# Patient Record
Sex: MALE | Employment: UNEMPLOYED | URBAN - METROPOLITAN AREA
[De-identification: names, ages, dates, MRNs, and addresses within clinical notes are randomized per-mention and may not be internally consistent; named-entity substitution may affect disease eponyms.]

---

## 2017-01-27 ENCOUNTER — TELEPHONE (OUTPATIENT)
Dept: MEDICAL GROUP | Age: 52
End: 2017-01-27

## 2017-01-27 DIAGNOSIS — H40.9 GLAUCOMA, UNSPECIFIED GLAUCOMA, UNSPECIFIED LATERALITY: ICD-10-CM

## 2017-01-27 NOTE — TELEPHONE ENCOUNTER
----- Message from Your Healthcare Team sent at 1/26/2017  9:30 PM PST -----  Regarding: Non-Urgent Medical Question  Contact: [phone]  Hi.  I need a referral for my eyes.  They seem to be getting worse.  Dr Alston.  Thank you.

## 2017-01-28 NOTE — TELEPHONE ENCOUNTER
1. Caller Name: Adrián Juárez                                         Call Back Number: [phone] (home)       Patient approves a detailed voicemail message: N\A    2. SPECIFIC Action To Be Taken: Referral pending, please sign.    3. Diagnosis/Clinical Reason for Request: [place]/Patient stated eyes are getting worse    4. Specialty & Provider Name/Lab/Imaging Location: ophthalmology Dr. Alston    5. Is appointment scheduled for requested order/referral: no    Patient informed they will receive a return phone call from the office ONLY if there are any questions before processing their request. Advised to call back if they haven't received a call from the referral department in 5 days.

## 2017-01-28 NOTE — TELEPHONE ENCOUNTER
----- Message from Your Healthcare Team sent at 1/26/2017  9:30 PM PST -----  Regarding: Non-Urgent Medical Question  Contact: [phone]  Hi.  I need a referral for my eyes.  They seem to be getting worse.  Dr Alston.  Thank you.

## 2017-02-02 NOTE — TELEPHONE ENCOUNTER
1. Caller Name: Adrián Juárez                                           Call Back Number: [phone] (home)        Patient approves a detailed voicemail message: N\A    Informed patient that referral has been placed.

## 2017-04-28 RX ORDER — SERTRALINE HYDROCHLORIDE 100 MG/1
TABLET, FILM COATED ORAL
Qty: 135 TAB | Refills: 0 | Status: SHIPPED | OUTPATIENT
Start: 2017-04-28 | End: 2017-06-29 | Stop reason: SDUPTHER

## 2017-06-29 ENCOUNTER — OFFICE VISIT (OUTPATIENT)
Dept: MEDICAL GROUP | Facility: LAB | Age: 52
End: 2017-06-29
Payer: COMMERCIAL

## 2017-06-29 VITALS
DIASTOLIC BLOOD PRESSURE: 68 MMHG | SYSTOLIC BLOOD PRESSURE: 112 MMHG | TEMPERATURE: 98.5 F | BODY MASS INDEX: 27.2 KG/M2 | RESPIRATION RATE: 12 BRPM | HEART RATE: 66 BPM | WEIGHT: 190 LBS | OXYGEN SATURATION: 96 % | HEIGHT: 70 IN

## 2017-06-29 DIAGNOSIS — F41.1 GAD (GENERALIZED ANXIETY DISORDER): ICD-10-CM

## 2017-06-29 DIAGNOSIS — H57.89 BURNING SENSATION IN EYE: ICD-10-CM

## 2017-06-29 DIAGNOSIS — M79.2 RADICULAR PAIN IN RIGHT ARM: ICD-10-CM

## 2017-06-29 DIAGNOSIS — R73.01 IMPAIRED FASTING GLUCOSE: ICD-10-CM

## 2017-06-29 DIAGNOSIS — M72.2 PLANTAR FASCIITIS, BILATERAL: ICD-10-CM

## 2017-06-29 DIAGNOSIS — Z00.00 HEALTH MAINTENANCE EXAMINATION: ICD-10-CM

## 2017-06-29 DIAGNOSIS — E55.9 VITAMIN D INSUFFICIENCY: ICD-10-CM

## 2017-06-29 DIAGNOSIS — E78.2 MIXED HYPERLIPIDEMIA: ICD-10-CM

## 2017-06-29 DIAGNOSIS — H40.1110 PRIMARY OPEN ANGLE GLAUCOMA OF RIGHT EYE, UNSPECIFIED GLAUCOMA STAGE: ICD-10-CM

## 2017-06-29 PROBLEM — H40.1190 PRIMARY OPEN ANGLE GLAUCOMA: Status: ACTIVE | Noted: 2017-06-29

## 2017-06-29 PROCEDURE — 99214 OFFICE O/P EST MOD 30 MIN: CPT | Performed by: FAMILY MEDICINE

## 2017-06-29 RX ORDER — SERTRALINE HYDROCHLORIDE 100 MG/1
200 TABLET, FILM COATED ORAL
Qty: 180 TAB | Refills: 3 | Status: SHIPPED | OUTPATIENT
Start: 2017-06-29 | End: 2018-07-12 | Stop reason: SDUPTHER

## 2017-06-29 ASSESSMENT — PATIENT HEALTH QUESTIONNAIRE - PHQ9: CLINICAL INTERPRETATION OF PHQ2 SCORE: 0

## 2017-06-29 NOTE — MR AVS SNAPSHOT
"        Adrián Juárez   2017 1:00 PM   Office Visit   MRN: [id number]    Department:  [facility]   Dept Phone:  [phone]    Description:  Male : 1965   Provider:  Mily Cha M.D.           Reason for Visit     Establish Care           Allergies as of 2017     Allergen Noted Reactions    Nkda [No Known Drug Allergy] 2010         You were diagnosed with     ALLIE (generalized anxiety disorder)   [974262]       Radicular pain in right arm   [292882]       Plantar fasciitis, bilateral   [135680]       Primary open angle glaucoma of right eye, unspecified glaucoma stage   [7728715]       Burning sensation in eye   [855726]       Impaired fasting glucose   [790.21.ICD-9-CM]       Mixed hyperlipidemia   [272.2.ICD-9-CM]       Vitamin D insufficiency   [671838]       Health maintenance examination   [692736]         Vital Signs     Blood Pressure Pulse Temperature Respirations Height Weight    112/68 mmHg 66 36.9 °C (98.5 °F) 12 1.778 m (5' 10\") 86.183 kg (190 lb)    Body Mass Index Oxygen Saturation Smoking Status             27.26 kg/m2 96% Never Smoker          Basic Information     Date Of Birth Sex Race Ethnicity Preferred Language    1965 Male Unknown Unknown English      Your appointments     Dec 15, 2017  8:40 AM   Established Patient with Mily Cha M.D.   [facility] (--)    [address] [address] [id number] [phone]           You will be receiving a confirmation call a few days before your appointment from our automated call confirmation system.              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   2011 - Present    Vitamin " D insufficiency E55.9   2/25/2012 - Present    Insomnia G47.00   4/30/2012 - Present    Impaired fasting glucose R73.01   4/15/2013 - Present    Coccyxdynia M53.3   12/3/2013 - Present    Elevated BP HVL2211   2/21/2014 - Present    Family history of colon cancer Z80.0   3/13/2015 - Present    Glaucoma H40.9   3/13/2015 - Present    Primary open angle glaucoma H40.1190   6/29/2017 - Present      Health Maintenance        Date Due Completion Dates    IMM DTaP/Tdap/Td Vaccine (1 - Tdap) 9/23/1986 ---    COLONOSCOPY 6/29/2020 6/29/2015            Current Immunizations     Influenza TIV (IM) 11/22/2016, 11/8/2014, 12/2/2013    Influenza Vaccine Quad Inj (Preserved) 2/19/2016 12:19 PM      Below and/or attached are the medications your provider expects you to take. Review all of your home medications and newly ordered medications with your provider and/or pharmacist. Follow medication instructions as directed by your provider and/or pharmacist. Please keep your medication list with you and share with your provider. Update the information when medications are discontinued, doses are changed, or new medications (including over-the-counter products) are added; and carry medication information at all times in the event of emergency situations     Allergies:  NKDA - (reactions not documented)               Medications  Valid as of: June 29, 2017 -  1:41 PM    Generic Name Brand Name Tablet Size Instructions for use    Atorvastatin Calcium (Tab) LIPITOR 20 MG TAKE 1 TABLET BY MOUTH AT BEDTIME        Bimatoprost (Solution) LUMIGAN 0.01 % Place 1 Drop in both eyes every bedtime.        Cholecalciferol (Tab) cholecalciferol 1000 UNIT Take 2,000 Units by mouth 2 Times a Day.        Omega-3 Fatty Acids (Cap) OMEGA 3 FA 1000 MG Take 1,000 mg by mouth 2 Times a Day.        Sertraline HCl (Tab) ZOLOFT 100 MG Take 2 Tabs by mouth every day.        .                 Medicines prescribed today were sent to:     [id number]  - [place], [place] - [address] AT [facility]    [address] [place] [address]    Phone: [phone] Fax: [phone]    Open 24 Hours?: No      Medication refill instructions:       If your prescription bottle indicates you have medication refills left, it is not necessary to call your provider’s office. Please contact your pharmacy and they will refill your medication.    If your prescription bottle indicates you do not have any refills left, you may request refills at any time through one of the following ways: The online [employer] system (except Urgent Care), by calling your provider’s office, or by asking your pharmacy to contact your provider’s office with a refill request. Medication refills are processed only during regular business hours and may not be available until the next business day. Your provider may request additional information or to have a follow-up visit with you prior to refilling your medication.   *Please Note: Medication refills are assigned a new Rx number when refilled electronically. Your pharmacy may indicate that no refills were authorized even though a new prescription for the same medication is available at the pharmacy. Please request the medicine by name with the pharmacy before contacting your provider for a refill.        Your To Do List     Future Labs/Procedures Complete By Expires    COMP METABOLIC PANEL  As directed 6/30/2018    HEMOGLOBIN A1C  As directed 6/30/2018    LIPID PROFILE  As directed 6/30/2018    VITAMIN D,25 HYDROXY  As directed 6/30/2018      Referral     A referral request has been sent to our patient care coordination department. Please allow 3-5 business days for us to process this request and contact you either by phone or mail. If you do not hear from us by the 5th business day, please call us at [phone].           [employer] Access Code: Activation code not generated  Current [employer] Status: Active

## 2017-06-30 NOTE — PROGRESS NOTES
Adrián Juárez is a 51 y.o. male here for   Chief Complaint   Patient presents with   • Establish Care       HPI:  Adrián is a very pleasant 51 y.o. male. He is here today to establish care. He owns a cleaning supply company. Previous PCP Dr. Boyd     1. ALLIE (generalized anxiety disorder)  This is chronic. Patient is currently prescribed Zoloft 50 mg daily. Patient has been increasing this every few days to 200 mg seems to control his outbursts and anxiety much better. He denies any depressive symptoms. He is eating and sleeping well. He denies any suicidal thoughts. He would like to increase his medication if possible    2. Radicular pain in right arm  This is a new problem to discuss. The last couple of years patient has developed right wrist and elbow pain. He notices this is much worse when using his mouse. He did upgrade to a more ergonomic mouse which did seem to help. He states that the pain is sometimes in his wrists but other times on the lateral side of his elbow. When it is in his elbow ache radiates up into his shoulder and down into his fingers. He does get a burning sensation at times. He does not have any numbness or weakness. He has not noticed any swelling. He has no discrete injury that he can think of. He has purchased a left-handed mouse to see if this will resolve his symptoms.    3. Plantar fasciitis, bilateral  This is a new problem to discuss. Patient reports bilateral plantar foot pain worst first thing in the morning. It is improved with stretching. He has not had any injury. He denies any bruising or swelling. He denies any numbness or tingling. It is an aching pain.    4. Primary open angle glaucoma of right eye, unspecified glaucoma stage  5. Burning sensation in eye  Glaucoma is chronic and he has been seeing an ophthalmologist. He was recently prescribed a new eyedrop to go on both eyes although the glaucoma is only on the right by his report. He has developed stinging and burning in the eyes  when he puts in drops. He states that the eyes become bright red. He denies any vision changes.    6. Impaired fasting glucose  This is chronic. Patient was on metformin many years ago but did lose weight and was taken off of this. He continues to have elevated fasting glucose. His hemoglobin A1c has been in the prediabetes range. His last labs were done in August 2016. He has dessert most nights and is not dieting although he did quit drinking alcohol 3 years ago    7. Mixed hyperlipidemia  This is chronic. Doing well.  Taking atorvastatin 20 mg daily as prescribed. No myalgias, GI upset, or other side effects from medication. Denies CP or stroke symptoms. Also taking a daily ASA. Last lipid panel August 2016 and was reviewed with the patient.     8. Vitamin D insufficiency  This is chronic. Currently taking 2000 units of vitamin D most days. Sometimes he forgets.    9. Health maintenance examination  Patient did have an early colonoscopy and this is scanned into the chart. He is unsure about his last tetanus booster but declines this today and asks if he can get this at his next visit      Current medicines (including changes today)  Current Outpatient Prescriptions   Medication Sig Dispense Refill   • sertraline (ZOLOFT) 100 MG Tab Take 2 Tabs by mouth every day. 180 Tab 3   • atorvastatin (LIPITOR) 20 MG Tab TAKE 1 TABLET BY MOUTH AT BEDTIME 90 Tab 2   • vitamin D (CHOLECALCIFEROL) 1000 UNIT TABS Take 2,000 Units by mouth 2 Times a Day.     • bimatoprost (LUMIGAN) 0.01 % SOLN Place 1 Drop in both eyes every bedtime.     • docosahexanoic acid (FISH OIL) 1000 MG CAPS Take 1,000 mg by mouth 2 Times a Day.       No current facility-administered medications for this visit.     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  He  has past surgical history that includes dental extraction(s).  Social History   Substance Use Topics   • Smoking status: Never Smoker    • Smokeless tobacco: Never Used   • Alcohol Use: No      Comment: None since July 28, 2013. C+A+G+E-. Oct 2013: resumed drinking \"a few times per week\"     Social History     Social History Narrative     Family History   Problem Relation Age of Onset   • Cancer Father 60     Colon   • Diabetes Father      Family Status   Relation Status Death Age   • Father Alive      dx colon cancer 60s         ROS  Positive for arm pain, foot pain, itchy eyes, red eyes, neck stiffness  All other systems reviewed and are negative     Objective:     Blood pressure 112/68, pulse 66, temperature 36.9 °C (98.5 °F), resp. rate 12, height 1.778 m (5' 10\"), weight 86.183 kg (190 lb), SpO2 96 %. Body mass index is 27.26 kg/(m^2).  Physical Exam:    Constitutional: Alert, no distress.  Skin: Warm, dry, good turgor, no rashes in visible areas.  Eye: Equal, round and reactive, conjunctiva clear, lids normal.  ENMT: Lips without lesions, good dentition, oropharynx clear. TM's pearly gray with normal light reflexes bilaterally  Neck: Trachea midline, no masses, no thyromegaly. No cervical or supraclavicular lymphadenopathy.  Respiratory: Unlabored respiratory effort, lungs clear to auscultation bilaterally, no wheezes, no ronchi.  Cardiovascular: Normal S1, S2, RRR, no murmur, no edema.  Abdomen: Soft, non-tender, no masses, no hepatosplenomegaly.  Psych: Alert and oriented x3, normal affect and mood.      Assessment and Plan:   The following treatment plan was discussed    1. ALLIE (generalized anxiety disorder)  Chronic, not controlled  Increase Zoloft to 200 mg daily  Return in 6 months, sooner if no improvement of his symptoms  - sertraline (ZOLOFT) 100 MG " Tab; Take 2 Tabs by mouth every day.  Dispense: 180 Tab; Refill: 3    2. Radicular pain in right arm  New, likely secondary to his work using his mouse often throughout the day  Stretching exercises and physical therapy ordered  - REFERRAL TO PHYSICAL THERAPY Reason for Therapy: Eval/Treat/Report    3. Plantar fasciitis, bilateral  New, home exercises including stretching, ice, massage prescribed. Return if no improvement    4. Primary open angle glaucoma of right eye, unspecified glaucoma stage  Chronic, stable, continue follow-up with ophthalmology  - REFERRAL TO OPHTHALMOLOGY    5. Burning sensation in eye  New, likely secondary to eyedrops.   I have asked him to discuss this with his ophthalmologist  - REFERRAL TO OPHTHALMOLOGY    6. Impaired fasting glucose  Chronic, stable, recheck labs  - COMP METABOLIC PANEL; Future  - HEMOGLOBIN A1C; Future    7. Mixed hyperlipidemia  Chronic, stable, recheck labs  - LIPID PROFILE; Future    8. Vitamin D insufficiency  Chronic, stable, recheck labs  - VITAMIN D,25 HYDROXY; Future    9. Health maintenance examination  Labs ordered, patient will get his tetanus booster at the next visit as he does not want to get this done today.  - COMP METABOLIC PANEL; Future  - HEMOGLOBIN A1C; Future  - LIPID PROFILE; Future  - VITAMIN D,25 HYDROXY; Future      Records requested.  Followup: Return in about 6 months (around 12/29/2017) for prediabetes.         This note was created using voice recognition software. I have made every reasonable attempt to correct errors, however, I do anticipate some grammatical errors.

## 2017-08-29 DIAGNOSIS — E78.2 MIXED HYPERLIPIDEMIA: ICD-10-CM

## 2017-08-30 DIAGNOSIS — E78.2 MIXED HYPERLIPIDEMIA: ICD-10-CM

## 2017-08-30 RX ORDER — ATORVASTATIN CALCIUM 20 MG/1
TABLET, FILM COATED ORAL
Refills: 0 | OUTPATIENT
Start: 2017-08-30

## 2017-08-30 RX ORDER — ATORVASTATIN CALCIUM 20 MG/1
TABLET, FILM COATED ORAL
Qty: 90 TAB | Refills: 0 | Status: SHIPPED | OUTPATIENT
Start: 2017-08-30 | End: 2017-12-07 | Stop reason: SDUPTHER

## 2017-10-18 ENCOUNTER — OFFICE VISIT (OUTPATIENT)
Dept: MEDICAL GROUP | Facility: LAB | Age: 52
End: 2017-10-18
Payer: COMMERCIAL

## 2017-10-18 VITALS
RESPIRATION RATE: 12 BRPM | TEMPERATURE: 97.8 F | SYSTOLIC BLOOD PRESSURE: 108 MMHG | DIASTOLIC BLOOD PRESSURE: 66 MMHG | HEIGHT: 70 IN | WEIGHT: 194 LBS | OXYGEN SATURATION: 97 % | HEART RATE: 56 BPM | BODY MASS INDEX: 27.77 KG/M2

## 2017-10-18 DIAGNOSIS — Z23 NEED FOR VACCINATION: ICD-10-CM

## 2017-10-18 DIAGNOSIS — K21.9 GASTROESOPHAGEAL REFLUX DISEASE, ESOPHAGITIS PRESENCE NOT SPECIFIED: ICD-10-CM

## 2017-10-18 DIAGNOSIS — K59.01 SLOW TRANSIT CONSTIPATION: ICD-10-CM

## 2017-10-18 DIAGNOSIS — J39.2 THROAT IRRITATION: ICD-10-CM

## 2017-10-18 PROBLEM — G89.29 CHRONIC RIGHT SHOULDER PAIN: Status: ACTIVE | Noted: 2017-10-18

## 2017-10-18 PROBLEM — M25.511 CHRONIC RIGHT SHOULDER PAIN: Status: ACTIVE | Noted: 2017-10-18

## 2017-10-18 PROCEDURE — 90471 IMMUNIZATION ADMIN: CPT | Performed by: FAMILY MEDICINE

## 2017-10-18 PROCEDURE — 90686 IIV4 VACC NO PRSV 0.5 ML IM: CPT | Performed by: FAMILY MEDICINE

## 2017-10-18 PROCEDURE — 99214 OFFICE O/P EST MOD 30 MIN: CPT | Mod: 25 | Performed by: FAMILY MEDICINE

## 2017-10-18 NOTE — PROGRESS NOTES
Subjective:   Adrián Juárez is a 52 y.o. male here today for   Chief Complaint   Patient presents with   • Other     scratch in throat x 1wk     1. Throat irritation  5 days of feeling as though there were two scratches or abrasions on the top of the soft palate  It worsened to irritation along the full aspect of the soft palate  No difficulty swallowing  Allergy symptoms started last night with itchy nose and eyes  Took 400mg advil yesterday which did help  Does have chronic GERD But has generally been well controlled up until the last week    2. Gastroesophageal reflux disease, esophagitis presence not specified  Patient reports GERD several years ago when drinking excessively  Resolved over last 3 years until this last week  Worsened over last week  GERD symptoms worsened with fried/greasy foods  He states that he did have an upper endoscopy at one point    3. Slow transit constipation  Chronic constipation since quit drinking ETOH 3 years ago   Taking otc medications including Ex-Lax which does help. He does have a family history of what sounds like irritable bowel syndrome with constipation.      Current medicines (including changes today)  Current Outpatient Prescriptions   Medication Sig Dispense Refill   • atorvastatin (LIPITOR) 20 MG Tab TAKE 1 TABLET BY MOUTH AT BEDTIME 90 Tab 0   • sertraline (ZOLOFT) 100 MG Tab Take 2 Tabs by mouth every day. 180 Tab 3   • vitamin D (CHOLECALCIFEROL) 1000 UNIT TABS Take 2,000 Units by mouth 2 Times a Day.     • bimatoprost (LUMIGAN) 0.01 % Solution Place 1 Drop in both eyes every bedtime.     • docosahexanoic acid (FISH OIL) 1000 MG CAPS Take 1,000 mg by mouth 2 Times a Day.       No current facility-administered medications for this visit.      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    ROS   No fevers  No bowel changes  No LE edema       Objective:     Blood pressure 108/66, pulse (!) 56, temperature 36.6 °C (97.8 °F), resp. rate 12, height 1.778 m (5' 10\"), weight 88 kg (194 lb), SpO2 97 %. Body mass index is 27.84 kg/m².   Physical Exam:  Constitutional: Alert, no distress.  Skin: Warm, dry, good turgor, no rashes in visible areas.  Eye: Equal, round and reactive, conjunctiva clear, lids normal.  ENMT: Lips without lesions, good dentition, oropharynx hyperemic , soft palate, left greater than right.  Neck: Trachea midline, no masses, no thyromegaly. No cervical or supraclavicular lymphadenopathy  Respiratory: Unlabored respiratory effort, lungs clear to auscultation, no wheezes, no ronchi.  Cardiovascular: Normal S1, S2, RRR, no murmur, no edema.  Abdomen: Soft, non-tender, no masses, no hepatosplenomegaly.  Psych: Alert and oriented x3, normal affect and mood.      Assessment and Plan:   The following treatment plan was discussed    1. Throat irritation  New, likely secondary to allergies. Recommended daily Zyrtec and Flonase. Return if no improvement    2. Gastroesophageal reflux disease, esophagitis presence not specified  Chronic, worsened over the last week. Improve diet and follow up if no improvement    3. Slow transit constipation  Chronic, recommended over-the-counter MiraLAX rather than Ex-Lax for a safer laxative. Discussed increasing fiber and water    4. Need for vaccination  - INFLUENZA VACCINE QUAD INJ >3Y(PF)      Followup: Return if symptoms worsen or fail to improve.       This note was created using voice recognition software. I have made every reasonable attempt to correct errors, however, I do anticipate some grammatical errors.   "

## 2017-11-02 ENCOUNTER — OFFICE VISIT (OUTPATIENT)
Dept: MEDICAL GROUP | Facility: LAB | Age: 52
End: 2017-11-02
Payer: COMMERCIAL

## 2017-11-02 VITALS
DIASTOLIC BLOOD PRESSURE: 88 MMHG | SYSTOLIC BLOOD PRESSURE: 120 MMHG | OXYGEN SATURATION: 95 % | TEMPERATURE: 97.6 F | BODY MASS INDEX: 27.63 KG/M2 | HEART RATE: 79 BPM | WEIGHT: 193 LBS | RESPIRATION RATE: 12 BRPM | HEIGHT: 70 IN

## 2017-11-02 DIAGNOSIS — K13.70 LESION OF MOUTH: ICD-10-CM

## 2017-11-02 PROCEDURE — 99214 OFFICE O/P EST MOD 30 MIN: CPT | Performed by: NURSE PRACTITIONER

## 2017-11-02 RX ORDER — ACYCLOVIR 400 MG/1
400 TABLET ORAL 3 TIMES DAILY
Qty: 21 TAB | Refills: 0 | Status: SHIPPED | OUTPATIENT
Start: 2017-11-02 | End: 2017-11-15 | Stop reason: SDUPTHER

## 2017-11-02 NOTE — PROGRESS NOTES
"Chief Complaint   Patient presents with   • Other     F/V on throat irritation, pt saw Dr. Cha last week, states he has an itchy throat that is at the entrance to his throat and the top of his mouth, headache, possible fever, some congestion        HPI  Adrián is a 53 yo est male here with c/o scratchy feeling mouth x the past two weeks - new to me.  Saw Dr. Cha last week and was told to try zyrtec which helped for one day.  States that scratching moves from front to back of his mouth on day to day basis.  Stinging with food and water.  Only thing new to him is benefiber to coffee in the morning.  gerd doing well.  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  No immunocompromise, DM or any major medical dx that he is aware of besides hyperlipidemia and anxiety.  Has felt feverish / achy.  Nonsmoker.        Past medical, surgical, family, and social history is reviewed and updated in Epic chart by me today.   Medications and allergies reviewed and updated in Epic chart by me today.     ROS:   Denies nausea, vomiting or diarrhea. Denies unintentional weight loss or difficulty breathing    Exam:  Blood pressure 120/88, pulse 79, temperature 36.4 °C (97.6 °F), resp. rate 12, height 1.778 m (5' 10\"), weight 87.5 kg (193 lb), SpO2 95 %.  Constitutional: Alert, no distress, plus 3 vital signs  Skin:  Warm, dry, no rashes invisible areas  Eye: Equal, round and reactive, conjunctiva clear  ENMT: Lips without lesions, good dentition, oropharynx with two ulcerations to posterior soft palate that are irregularly shaped, no other abnormalities.     Neck: mild anterior cervical LAD  Respiratory: Unlabored respiration  Cardiovascular: Normal rate  Psych: Alert, pleasant, well-groomed, normal affect    A/P:  1. Lesion of mouth  -Discussed the differential diagnoses of mouth lesions the patient and he understands that this could be various different issues but is likely viral. We'll start " acyclovir 400 mg 3 times a day for 7 days especially considering the duration of his mouth lesions. Discussed potential contagiousness precautions. Referred to ENT in case lesions do not resolve and he understands the importance of following up there within 2-3 weeks.  - acyclovir (ZOVIRAX) 400 MG tablet; Take 1 Tab by mouth 3 times a day for 7 days.  Dispense: 21 Tab; Refill: 0  - REFERRAL TO ENT

## 2017-11-15 DIAGNOSIS — K13.70 LESION OF MOUTH: ICD-10-CM

## 2017-11-15 RX ORDER — ACYCLOVIR 400 MG/1
400 TABLET ORAL 3 TIMES DAILY
Qty: 21 TAB | Refills: 0 | Status: SHIPPED | OUTPATIENT
Start: 2017-11-15 | End: 2017-11-22

## 2017-12-07 DIAGNOSIS — E78.2 MIXED HYPERLIPIDEMIA: ICD-10-CM

## 2017-12-07 RX ORDER — ATORVASTATIN CALCIUM 20 MG/1
TABLET, FILM COATED ORAL
Qty: 90 TAB | Refills: 3 | Status: SHIPPED | OUTPATIENT
Start: 2017-12-07

## 2018-01-22 LAB — HBA1C MFR BLD: 5.9 % (ref ?–5.8)

## 2018-01-25 ENCOUNTER — OFFICE VISIT (OUTPATIENT)
Dept: MEDICAL GROUP | Facility: LAB | Age: 53
End: 2018-01-25
Payer: COMMERCIAL

## 2018-01-25 VITALS
DIASTOLIC BLOOD PRESSURE: 76 MMHG | SYSTOLIC BLOOD PRESSURE: 98 MMHG | WEIGHT: 196 LBS | OXYGEN SATURATION: 95 % | RESPIRATION RATE: 12 BRPM | HEART RATE: 60 BPM | TEMPERATURE: 96.6 F | BODY MASS INDEX: 28.06 KG/M2 | HEIGHT: 70 IN

## 2018-01-25 DIAGNOSIS — Z23 NEED FOR VACCINATION: ICD-10-CM

## 2018-01-25 DIAGNOSIS — M25.511 CHRONIC RIGHT SHOULDER PAIN: ICD-10-CM

## 2018-01-25 DIAGNOSIS — G89.29 CHRONIC RIGHT SHOULDER PAIN: ICD-10-CM

## 2018-01-25 DIAGNOSIS — E78.1 HYPERTRIGLYCERIDEMIA: ICD-10-CM

## 2018-01-25 DIAGNOSIS — R73.01 IMPAIRED FASTING GLUCOSE: ICD-10-CM

## 2018-01-25 DIAGNOSIS — K12.1 MOUTH ULCER: ICD-10-CM

## 2018-01-25 DIAGNOSIS — Z00.00 HEALTH MAINTENANCE EXAMINATION: ICD-10-CM

## 2018-01-25 DIAGNOSIS — F41.1 GAD (GENERALIZED ANXIETY DISORDER): ICD-10-CM

## 2018-01-25 PROCEDURE — 90715 TDAP VACCINE 7 YRS/> IM: CPT | Performed by: FAMILY MEDICINE

## 2018-01-25 PROCEDURE — 90471 IMMUNIZATION ADMIN: CPT | Performed by: FAMILY MEDICINE

## 2018-01-25 PROCEDURE — 99396 PREV VISIT EST AGE 40-64: CPT | Mod: 25 | Performed by: FAMILY MEDICINE

## 2018-01-25 NOTE — PROGRESS NOTES
Subjective:   Adrián Juárez is a 52 y.o. male here today for   Chief Complaint   Patient presents with   • Annual Exam     labs    • Immunizations     TDAP due / will check web IZ       1. Health maintenance examination  Preventative Health (Men):  Tetanus: > 10 years  Pneumonia vaccine: not indicated    Flu shot: done   Colon Cancer Screenin   Lipid Panel: 2018  Prostate Screening discussion: not done  Diet: Healthier  Exercise: None at this time    Labs done 18 - reviewed today and scanned into chart     2. Impaired fasting glucose  Chroic   HbA1c 5.9%  Overeating and not exercising    3. Hypertriglyceridemia  Chronic, resolved  No ETOH x 3 yrs  On lipitor 20mg - no side effects     4. Mouth ulcer  Chronic   Started in time of stress  Treated with acyclovir   Saw ENT Dr. Ang  Increased dose of acyclovir    5. Need for vaccination  Due for tdap    6. Chronic right shoulder pain  Chronic  Went through PT has 1 visit left  Also did dry needling  Ergonomic work station  Improved radiculopathy    7. ALLIE (generalized anxiety disorder)  Chronic  Stable on zoloft 200mg daily  Mood overall good  No panic attacks or suicidality  Not exercising      Current medicines (including changes today)  Current Outpatient Prescriptions   Medication Sig Dispense Refill   • atorvastatin (LIPITOR) 20 MG Tab TAKE 1 TABLET BY MOUTH EVERY NIGHT AT BEDTIME 90 Tab 3   • sertraline (ZOLOFT) 100 MG Tab Take 2 Tabs by mouth every day. 180 Tab 3   • vitamin D (CHOLECALCIFEROL) 1000 UNIT TABS Take 2,000 Units by mouth 2 Times a Day.     • bimatoprost (LUMIGAN) 0.01 % Solution Place 1 Drop in both eyes every bedtime.     • docosahexanoic acid (FISH OIL) 1000 MG CAPS Take 1,000 mg by mouth 2 Times a Day.       No current facility-administered medications for this visit.      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    ROS   No fevers  No bowel changes  No LE edema       Objective:     Blood pressure (!) 98/76, pulse 60, temperature 35.9 °C (96.6 °F), resp. rate 12, height 1.778 m (5' 10\"), weight 88.9 kg (196 lb), SpO2 95 %. Body mass index is 28.12 kg/m².   Physical Exam:  Constitutional: Alert, no distress.  Skin: Warm, dry, good turgor, no rashes in visible areas.  Eye: Equal, round and reactive, conjunctiva clear, lids normal.  ENMT: Lips without lesions, good dentition, oropharynx clear.  Neck: Trachea midline, no masses, no thyromegaly. No cervical or supraclavicular lymphadenopathy  Respiratory: Unlabored respiratory effort, lungs clear to auscultation, no wheezes, no ronchi.  Cardiovascular: Normal S1, S2, RRR, no murmur, no edema.  Abdomen: Soft, non-tender, no masses, no hepatosplenomegaly.  Psych: Alert and oriented x3, normal affect and mood.      Assessment and Plan:   The following treatment plan was discussed    1. Health maintenance examination  Labs reviewed  Tdap given today  Age appropriate counseling   All other HCM UTD    2. Impaired fasting glucose  Chronic  HbA1c stable at 5.9%  Recheck q6m  Counseled on diet, exercise    3. Hypertriglyceridemia  Chronic, stable, reviewed labs today  Continue statin    4. Mouth ulcer  Chronic, currently stable off of acyclovir    5. Need for vaccination  - TDAP VACCINE =>6YO IM    6. Chronic right shoulder pain  Chronic, stable with PT and change in work ergonomics    7. ALLIE (generalized anxiety disorder)  Chronic, stable on zoloft        Followup: Return in about 6 months (around 7/25/2018) for prediabetes (A1c this visit).       This note was created using voice recognition software. I have made every reasonable attempt to correct errors, however, " I do anticipate some grammatical errors.

## 2018-01-29 ENCOUNTER — TELEPHONE (OUTPATIENT)
Dept: MEDICAL GROUP | Facility: LAB | Age: 53
End: 2018-01-29

## 2018-01-29 DIAGNOSIS — K13.70 LESION OF MOUTH: ICD-10-CM

## 2018-01-29 NOTE — TELEPHONE ENCOUNTER
----- Message from MARY Conner sent at 1/29/2018  1:33 PM PST -----  Regarding: FW: Referral Request  Contact: [phone]      ----- Message -----  From: Adrián Juárez  Sent: 1/27/2018   5:43 PM  To: [facility]  Subject: Referral Request                                 Adrián Juárez would like to request a referral.  Reason: Mouth soars  Requested provider: Sav  Comment:  I have a follow up with Dr. Ang on Tuesday at 9 am.  They just called and said that I will need a new referral because it is a new insurance year.  Can you please help me out on this before that.  Thank you very much.

## 2018-07-12 DIAGNOSIS — F41.1 GAD (GENERALIZED ANXIETY DISORDER): ICD-10-CM

## 2018-07-13 RX ORDER — SERTRALINE HYDROCHLORIDE 100 MG/1
TABLET, FILM COATED ORAL
Qty: 180 TAB | Refills: 0 | Status: SHIPPED | OUTPATIENT
Start: 2018-07-13